# Patient Record
Sex: MALE | Race: WHITE
[De-identification: names, ages, dates, MRNs, and addresses within clinical notes are randomized per-mention and may not be internally consistent; named-entity substitution may affect disease eponyms.]

---

## 2020-12-16 ENCOUNTER — HOSPITAL ENCOUNTER (EMERGENCY)
Dept: HOSPITAL 54 - ER | Age: 25
Discharge: HOME | End: 2020-12-16
Payer: SELF-PAY

## 2020-12-16 VITALS
BODY MASS INDEX: 29.4 KG/M2 | DIASTOLIC BLOOD PRESSURE: 86 MMHG | HEIGHT: 71 IN | WEIGHT: 210 LBS | SYSTOLIC BLOOD PRESSURE: 172 MMHG

## 2020-12-16 DIAGNOSIS — B34.9: Primary | ICD-10-CM

## 2020-12-16 DIAGNOSIS — R09.89: ICD-10-CM

## 2020-12-16 DIAGNOSIS — Z20.828: ICD-10-CM

## 2020-12-16 DIAGNOSIS — R05: ICD-10-CM

## 2020-12-16 PROCEDURE — 99283 EMERGENCY DEPT VISIT LOW MDM: CPT

## 2020-12-16 PROCEDURE — U0003 INFECTIOUS AGENT DETECTION BY NUCLEIC ACID (DNA OR RNA); SEVERE ACUTE RESPIRATORY SYNDROME CORONAVIRUS 2 (SARS-COV-2) (CORONAVIRUS DISEASE [COVID-19]), AMPLIFIED PROBE TECHNIQUE, MAKING USE OF HIGH THROUGHPUT TECHNOLOGIES AS DESCRIBED BY CMS-2020-01-R: HCPCS

## 2020-12-16 PROCEDURE — C9803 HOPD COVID-19 SPEC COLLECT: HCPCS

## 2022-01-23 ENCOUNTER — HOSPITAL ENCOUNTER (EMERGENCY)
Dept: HOSPITAL 54 - ER | Age: 27
Discharge: HOME | End: 2022-01-23
Payer: SELF-PAY

## 2022-01-23 VITALS — WEIGHT: 210 LBS | BODY MASS INDEX: 29.4 KG/M2 | HEIGHT: 71 IN

## 2022-01-23 VITALS — DIASTOLIC BLOOD PRESSURE: 72 MMHG | SYSTOLIC BLOOD PRESSURE: 123 MMHG

## 2022-01-23 DIAGNOSIS — K59.00: Primary | ICD-10-CM

## 2022-01-23 DIAGNOSIS — E03.9: ICD-10-CM

## 2022-01-23 LAB
ALBUMIN SERPL BCP-MCNC: 4.3 G/DL (ref 3.4–5)
ALP SERPL-CCNC: 71 U/L (ref 46–116)
ALT SERPL W P-5'-P-CCNC: 34 U/L (ref 12–78)
AST SERPL W P-5'-P-CCNC: 17 U/L (ref 15–37)
BASOPHILS # BLD AUTO: 0 K/UL (ref 0–0.2)
BASOPHILS NFR BLD AUTO: 0.3 % (ref 0–2)
BILIRUB DIRECT SERPL-MCNC: 0.2 MG/DL (ref 0–0.2)
BILIRUB SERPL-MCNC: 0.6 MG/DL (ref 0.2–1)
BUN SERPL-MCNC: 16 MG/DL (ref 7–18)
CALCIUM SERPL-MCNC: 9.1 MG/DL (ref 8.5–10.1)
CHLORIDE SERPL-SCNC: 102 MMOL/L (ref 98–107)
CO2 SERPL-SCNC: 27 MMOL/L (ref 21–32)
CREAT SERPL-MCNC: 1.2 MG/DL (ref 0.6–1.3)
EOSINOPHIL NFR BLD AUTO: 2 % (ref 0–6)
GLUCOSE SERPL-MCNC: 92 MG/DL (ref 74–106)
HCT VFR BLD AUTO: 46 % (ref 39–51)
HGB BLD-MCNC: 15.8 G/DL (ref 13.5–17.5)
LIPASE SERPL-CCNC: 253 U/L (ref 73–393)
LYMPHOCYTES NFR BLD AUTO: 1.9 K/UL (ref 0.8–4.8)
LYMPHOCYTES NFR BLD AUTO: 17.8 % (ref 20–44)
MCHC RBC AUTO-ENTMCNC: 34 G/DL (ref 31–36)
MCV RBC AUTO: 86 FL (ref 80–96)
MONOCYTES NFR BLD AUTO: 0.6 K/UL (ref 0.1–1.3)
MONOCYTES NFR BLD AUTO: 5.1 % (ref 2–12)
NEUTROPHILS # BLD AUTO: 8.1 K/UL (ref 1.8–8.9)
NEUTROPHILS NFR BLD AUTO: 74.8 % (ref 43–81)
PLATELET # BLD AUTO: 286 K/UL (ref 150–450)
POTASSIUM SERPL-SCNC: 4 MMOL/L (ref 3.5–5.1)
PROT SERPL-MCNC: 7.9 G/DL (ref 6.4–8.2)
RBC # BLD AUTO: 5.38 MIL/UL (ref 4.5–6)
SODIUM SERPL-SCNC: 139 MMOL/L (ref 136–145)
WBC NRBC COR # BLD AUTO: 10.8 K/UL (ref 4.3–11)

## 2022-01-23 PROCEDURE — 85025 COMPLETE CBC W/AUTO DIFF WBC: CPT

## 2022-01-23 PROCEDURE — 96374 THER/PROPH/DIAG INJ IV PUSH: CPT

## 2022-01-23 PROCEDURE — 96361 HYDRATE IV INFUSION ADD-ON: CPT

## 2022-01-23 PROCEDURE — 99284 EMERGENCY DEPT VISIT MOD MDM: CPT

## 2022-01-23 PROCEDURE — 80076 HEPATIC FUNCTION PANEL: CPT

## 2022-01-23 PROCEDURE — 36415 COLL VENOUS BLD VENIPUNCTURE: CPT

## 2022-01-23 PROCEDURE — 80048 BASIC METABOLIC PNL TOTAL CA: CPT

## 2022-01-23 PROCEDURE — 83690 ASSAY OF LIPASE: CPT

## 2022-01-23 PROCEDURE — 74021 RADEX ABDOMEN 3+ VIEWS: CPT

## 2022-01-23 NOTE — NUR
PT OK TO DISCHARGE HOMER PER MARÍA NP. IV removed. Catheter intact and site 
benign. Pressure and 4x4 applied to site. No bleeding noted.Patient discharged 
to home in stable condition. Written and verbal after care instructions given. 
Patient verbalizes understanding of instruction.Patient is awake and alert to 
self, day, and place. PT ambulatory with a steady gait